# Patient Record
Sex: MALE | Race: WHITE | HISPANIC OR LATINO | Employment: FULL TIME | URBAN - METROPOLITAN AREA
[De-identification: names, ages, dates, MRNs, and addresses within clinical notes are randomized per-mention and may not be internally consistent; named-entity substitution may affect disease eponyms.]

---

## 2017-10-16 ENCOUNTER — OCCUPATIONAL MEDICINE (OUTPATIENT)
Dept: URGENT CARE | Facility: PHYSICIAN GROUP | Age: 32
End: 2017-10-16
Payer: COMMERCIAL

## 2017-10-16 VITALS
BODY MASS INDEX: 28.14 KG/M2 | HEART RATE: 82 BPM | TEMPERATURE: 98.4 F | SYSTOLIC BLOOD PRESSURE: 112 MMHG | OXYGEN SATURATION: 100 % | HEIGHT: 71 IN | DIASTOLIC BLOOD PRESSURE: 76 MMHG | RESPIRATION RATE: 15 BRPM | WEIGHT: 201 LBS

## 2017-10-16 DIAGNOSIS — S83.411A SPRAIN OF MEDIAL COLLATERAL LIGAMENT OF RIGHT KNEE, INITIAL ENCOUNTER: ICD-10-CM

## 2017-10-16 LAB
AMP AMPHETAMINE: NORMAL
BREATH ALCOHOL COMMENT: NORMAL
COC COCAINE: NORMAL
INT CON NEG: NEGATIVE
INT CON POS: POSITIVE
MET METHAMPHETAMINES: NORMAL
OPI OPIATES: NORMAL
PCP PHENCYCLIDINE: NORMAL
POC BREATHALIZER: 0 PERCENT (ref 0–0.01)
POC DRUG COMMENT 753798-OCCUPATIONAL HEALTH: NORMAL
THC: NORMAL

## 2017-10-16 PROCEDURE — 80305 DRUG TEST PRSMV DIR OPT OBS: CPT | Mod: 29 | Performed by: PHYSICIAN ASSISTANT

## 2017-10-16 PROCEDURE — 99203 OFFICE O/P NEW LOW 30 MIN: CPT | Mod: 25,29 | Performed by: PHYSICIAN ASSISTANT

## 2017-10-16 PROCEDURE — 82075 ASSAY OF BREATH ETHANOL: CPT | Mod: 29 | Performed by: PHYSICIAN ASSISTANT

## 2017-10-16 ASSESSMENT — ENCOUNTER SYMPTOMS
CONSTITUTIONAL NEGATIVE: 1
BRUISES/BLEEDS EASILY: 0
NEUROLOGICAL NEGATIVE: 1

## 2017-10-16 NOTE — PROGRESS NOTES
"Subjective:      Alfredo Damon is a 32 y.o. male who presents with Knee Injury (Right knee. Twisted when coming down a ladder three days.)      DOI: 10/13/17, 1700.  Right knee.   Patient states he was states as he was coming down ladder and missed footing and slipped, got his foot stuck and believes he twisted the knee as he fell.  Had lots of pain and swelling Saturday and yesterday but today he feels that overall the pain has improved mildly.  Still does hurt to bend fully and some pain after walking/standing.    No numbness or tingling. He states he has put topical pain ointment and iced it.  No previous injury or pain with this knee.  He does not have a second job.    used: [employer] # [id number]     Knee Injury     as above.     Review of Systems   Constitutional: Negative.    Musculoskeletal:        SEE HPI   Skin: Negative.    Neurological: Negative.    Endo/Heme/Allergies: Does not bruise/bleed easily.       PMH:  has no past medical history on file.  MEDS:   Current Outpatient Prescriptions:   •  ibuprofen (MOTRIN) 800 MG Tab, Take 1 Tab by mouth every 8 hours as needed (pain)., Disp: 30 Tab, Rfl: 0  ALLERGIES: No Known Allergies  SURGHX: No past surgical history on file.  SOCHX:  reports that he has been smoking Cigarettes.  He has been smoking about 0.50 packs per day. He has never used smokeless tobacco. He reports that he drinks alcohol. He reports that he does not use drugs.  FH: Family history was reviewed, no pertinent findings to report     Objective:     /76   Pulse 82   Temp 36.9 °C (98.4 °F)   Resp 15   Ht 1.81 m (5' 11.25\")   Wt 91.2 kg (201 lb)   SpO2 100%   BMI 27.84 kg/m²      Physical Exam   Constitutional: He is oriented to person, place, and time. He appears well-developed and well-nourished. No distress.   Cardiovascular: Normal rate and regular rhythm.    Pulmonary/Chest: Effort normal and breath sounds normal.   Neurological: He is alert and oriented to person, " place, and time.   Skin: Skin is warm and dry.   Psychiatric: He has a normal mood and affect. His behavior is normal.     Vitals reviewed  Right knee: On inspection there is minimal appreciable swelling as compared to the left knee.   He does have mild medial aspect joint tenderness.   FROM however pain with full flexion and extension.  He has negative anterior and posterior drawer.  Mild + valgus stress.  Mild + bilateral Dick at full extension.        Assessment/Plan:     1. Sprain of medial collateral ligament of right knee, initial encounter         -restrictions as above, overall improving right knee sprain  -wrap placed, wear at work.   -RAD as above, also reviewed by myself.   -RICE therapy discussed in detail.  NSAIDs encouraged prn OTC  -RTC 3 days, sooner if better and wanting release to full duty.     Loni Davis P.A.-C.

## 2017-10-16 NOTE — LETTER
[facility]  [address] [place] [address]. [address] - [place] [address]  Phone:  [phone] - Fax:  [phone]   [facility] Progress Report and Disability Certification  Date of Service: 10/16/2017   No Show:  No  Date / Time of Next Visit: 10/19/2017 @ 8:30 AM   Claim Information   Patient Name: Alfredo Damon  Claim Number:     Employer:   [employer] Date of Injury: 10/13/2017     Insurer / TPA: [employer]  ID / SSN:     Occupation: contruction  Diagnosis: The encounter diagnosis was Sprain of medial collateral ligament of right knee, initial encounter.    Medical Information   Related to Industrial Injury? Yes    Subjective Complaints:  DOI: 10/13/17, 1700.  Right knee.   Patient states he was states as he was coming down ladder and missed footing and slipped, got his foot stuck and believes he twisted the knee as he fell.  Had lots of pain and swelling Saturday and yesterday but today he feels that overall the pain has improved mildly.  Still does hurt to bend fully and some pain after walking/standing.    No numbness or tingling. He states he has put topical pain ointment and iced it.  No previous injury or pain with this knee.  He does not have a second job.    used: [employer] # [id number]   Objective Findings: Vitals reviewed  Right knee: On inspection there is minimal appreciable swelling as compared to the left knee.   He does have mild medial aspect joint tenderness.   FROM however pain with full flexion and extension.  He has negative anterior and posterior drawer.  Mild + valgus stress.  Mild + bilateral Dick at full extension.    Pre-Existing Condition(s): None   Assessment:   Initial Visit    Status: Additional Care Required  Permanent Disability:No    Plan:      Diagnostics:   Comments:n/a, currently improving soft tissue injury    Comments:       Disability Information   Status: Released to Restricted Duty    From:   10/16/2017  Through: 10/19/2017 Restrictions are: Temporary   Physical Restrictions   Sitting:    Standing:  < or = to 4 hrs/day Stooping:    Bending:      Squattin hrs/day Walking:  < or = to 4 hrs/day Climbin hrs/day Pushing:      Pulling:    Other:    Reaching Above Shoulder (L):   Reaching Above Shoulder (R):       Reaching Below Shoulder (L):    Reaching Below Shoulder (R):      Not to exceed Weight Limits   Carrying(hrs):   Weight Limit(lb):   Lifting(hrs):   Weight  Limit(lb):     Comments: -restrictions as above, overall improving right knee sprain  -wrap placed, wear at work.   -RAD as above, also reviewed by myself.   -RICE therapy discussed in detail.  NSAIDs encouraged prn OTC  -RTC 3 days, sooner if better and wanting release to full duty.       Repetitive Actions   Hands: i.e. Fine Manipulations from Grasping:     Feet: i.e. Operating Foot Controls:     Driving / Operate Machinery:     Physician Name: Dewayne Davis P.A.-C. Physician Signature: DEWAYNE Arteaga P.A.-C. e-Signature: Dr. Antony Garcia, Medical Director   Clinic Name / Location: [address]  [place] [address] Clinic Phone Number: Dept: [phone]   Appointment Time: 8:55 Am Visit Start Time: 9:04 AM   Check-In Time:  9:00 Am Visit Discharge Time: 10:07 AM   Original-Treating Physician or Chiropractor    Page 2-Insurer/TPA    Page 3-Employer    Page 4-Employee

## 2017-10-16 NOTE — LETTER
"EMPLOYEE’S CLAIM FOR COMPENSATION/ REPORT OF INITIAL TREATMENT  FORM C-4    EMPLOYEE’S CLAIM - PROVIDE ALL INFORMATION REQUESTED   First Name  Alfredo Last Name  Marisol Birthdate                    1985                Sex  male Claim Number   Home Address  [address] Age  32 y.o. 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 (if applicable)  Un piso     How did this injury or occupational disease occur? (Be specific an answer in detail. Use additional sheet if necessary)  Ignacio sweet keisha. Was climbing down a ladder when my right knee popped and   got swollen over the weekend.   If you believe that you have an occupational disease, when did you first have knowledge of the disability and it relationship to your employment?  n/a Witnesses to the Accident  n/a      Nature of Injury or Occupational Disease  Strain  Part(s) of Body Injured or Affected  Knee (R), ,     I certify that the above is true and correct to the best of my knowledge and that I have provided this " information in order to obtain the benefits of [place] or [place]  I hereby authorize any physician, chiropractor, surgeon, practitioner, or other person, any hospital, including [facility] or [facility] hospital, any medical service organization, any insurance company, or other institution or organization to release to each other, any medical or other information, including benefits paid or payable, pertinent to this injury or disease, except information relative to diagnosis, treatment and/or counseling for AIDS, psychological conditions, alcohol or controlled substances, for which I must give specific authorization.  A Photostat of this authorization shall be as valid as the original.     Date   Place   Employee’s Signature   THIS REPORT MUST BE COMPLETED AND MAILED WITHIN 3 WORKING DAYS OF TREATMENT   Place  [facility]  Name of Facility  [facility]   Date  10/16/2017 Diagnosis  (S83.411A) Sprain of medial collateral ligament of right knee, initial encounter Is there evidence the injured employee was under the influence of alcohol and/or another controlled substance at the time of accident?   Hour  9:04 AM Description of Injury or Disease  The encounter diagnosis was Sprain of medial collateral ligament of right knee, initial encounter. No   Treatment  -restrictions as above, overall improving right knee sprain  -wrap placed, wear at work.   -RAD as above, also reviewed by myself.   -RICE therapy discussed in detail.  NSAIDs encouraged prn OTC  -RTC 3 days, sooner if better and wanting release to full duty.   Have you advised the patient to remain off work five days or more? No   X-Ray Findings      If Yes   From Date  To Date      From information given by the employee, together with medical evidence, can you directly connect this injury or occupational disease as job incurred?  Yes If  "No Full Duty  No Modified Duty  Yes   Is additional medical care by a physician indicated?  Yes If Modified Duty, Specify any Limitations / Restrictions  SEE d-39   Do you know of any previous injury or disease contributing to this condition or occupational disease?                            No   Date  10/16/2017 Print Doctor’s Name Dewayne Davis P.A.-C. I certify the employer’s copy of  this form was mailed on:   Address  [address] [place] [address]. [address] Insurer’s Use Only     [facility] Zip  [address]    Provider’s Tax ID Number  [id number] Telephone  Dept: [phone]        e-DEWAYNE Hernadez P.A.-C.   e-Signature: Dr. Antony Garcia, Medical Director Degree  PAULINE        ORIGINAL-TREATING PHYSICIAN OR CHIROPRACTOR    PAGE 2-INSURER/TPA    PAGE 3-EMPLOYER    PAGE 4-EMPLOYEE             Form C-4 (rev10/07)              BRIEF DESCRIPTION OF RIGHTS AND BENEFITS  (Pursuant to [place]RS 616C.050)    Notice of Injury or Occupational Disease (Incident Report Form C-1): If an injury or occupational disease (OD) arises out of and in the  course of employment, you must provide written notice to your employer as soon as practicable, but no later than 7 days after the accident or  OD. Your employer shall maintain a sufficient supply of the required forms.    Claim for Compensation (Form C-4): If medical treatment is sought, the form C-4 is available at the place of initial treatment. A completed  \"Claim for Compensation\" (Form C-4) must be filed within 90 days after an accident or OD. The treating physician or chiropractor must,  within 3 working days after treatment, complete and mail to the employer, the employer's insurer and third-party , the Claim for  Compensation.    Medical Treatment: If you require medical treatment for your on-the-job injury or OD, you may be required to select a physician or  chiropractor from a list provided by your workers’ compensation insurer, if it has contracted " with an Organization for Managed Care (MCO) or  Preferred Provider Organization (PPO) or providers of health care. If your employer has not entered into a contract with an MCO or PPO, you  may select a physician or chiropractor from the Panel of Physicians and Chiropractors. Any medical costs related to your industrial injury or  OD will be paid by your insurer.    Temporary Total Disability (TTD): If your doctor has certified that you are unable to work for a period of at least 5 consecutive days, or 5  cumulative days in a 20-day period, or places restrictions on you that your employer does not accommodate, you may be entitled to TTD  compensation.    Temporary Partial Disability (TPD): If the wage you receive upon reemployment is less than the compensation for TTD to which you are  entitled, the insurer may be required to pay you TPD compensation to make up the difference. TPD can only be paid for a maximum of 24  months.    Permanent Partial Disability (PPD): When your medical condition is stable and there is an indication of a PPD as a result of your injury or  OD, within 30 days, your insurer must arrange for an evaluation by a rating physician or chiropractor to determine the degree of your PPD. The  amount of your PPD award depends on the date of injury, the results of the PPD evaluation and your age and wage.    Permanent Total Disability (PTD): If you are medically certified by a treating physician or chiropractor as permanently and totally disabled  and have been granted a PTD status by your insurer, you are entitled to receive monthly benefits not to exceed 66 2/3% of your average  monthly wage. The amount of your PTD payments is subject to reduction if you previously received a PPD award.    Vocational Rehabilitation Services: You may be eligible for vocational rehabilitation services if you are unable to return to the job due to a  permanent physical impairment or permanent restrictions as a result of  your injury or occupational disease.    Transportation and Per Rafa Reimbursement: You may be eligible for travel expenses and per rafa associated with medical treatment.    Reopening: You may be able to reopen your claim if your condition worsens after claim closure.    Appeal Process: If you disagree with a written determination issued by the insurer or the insurer does not respond to your request, you may  appeal to the Department of Administration, , by following the instructions contained in your determination letter. You must  appeal the determination within 70 days from the date of the determination letter at [address], [address], [place], [place]  [address], or [address], [address], [place], [place] [address]. If you disagree with the  decision, you may appeal to the  Department of Administration, . You must file your appeal within 30 days from the date of the  decision  letter at [address], [address], [place], [place] [address], or [address], [facility] [address], [place] [address]. If you  disagree with a decision of an , you may file a petition for judicial review with the District Court. You must do so within 30  days of the Appeal Officer’s decision. You may be represented by an  at your own expense or you may contact the [facility] for possible  representation.    [place]  for Injured Workers (NAIW): If you disagree with a  decision, you may request that NAIW represent you  without charge at an  Hearing. For information regarding denial of benefits, you may contact the [facility] at: [address], [address], [place] [address], [phone], or [address] [facility] [address], [place] [address], [phone]    To File a Complaint with the Division: If you wish to file a complaint with the  of the Division of Industrial  Relations (DIR),  please contact the Workers’ Compensation Section, [address], [address], [place], [place] [address], telephone [phone], or  [address], [address], [place], [place] [address], telephone [phone].    For assistance with Workers’ Compensation Issues: you may contact the Office of the [facility] Consumer Health Assistance, [address] [place] [address], [address], [place], [place] [address], Toll Free 1-285.981.8638, Web site: http://govcha.Critical access hospital.nv., E-mail  [email or web address].                                                                                                                                                                                                                                   __________________________________________________________________                                                                   _________________                Employee Name / Signature                                                                                                                                                       Date                                                                                                                                                                                                     D-2 (rev. 10/07)

## 2017-10-16 NOTE — PATIENT INSTRUCTIONS
Esguince de rodilla  (Knee Sprain)    Un esguince de rodilla es un desgarro en terri de los tejidos jordan y fibrosos (ligamentos) que conectan los huesos de la rodilla. La gravedad del esguince depende de cuánto ligamento se rompe. La ruptura puede ser parcial o completa.  CAUSAS   A menudo, los esguinces son el resultado de lina caída o lina lesión. La fuerza del impacto hace que las fibras del ligamento se estiren más de holt lars normal. Nemo exceso de tensión es la causa de que las fibras del ligamento se rompan.  SIGNOS Y SÍNTOMAS   Es posible que pierda el movimiento de la rodilla. Otros síntomas son:  · Moretones.  · Dolor en la anthony de la rodilla.  · Sensibilidad de la rodilla al tacto.  · Hinchazón.  DIAGNÓSTICO   Para diagnosticar un esguince de rodilla, holt médico le hará un examen físico de la rodilla. Además, puede indicarle que se holly lina radiografía de la rodilla para asegurarse de que no haya huesos fracturados.  TRATAMIENTO   Si el ligamento está parcialmente roto, el tratamiento, habitualmente, consiste en mantener la rodilla en lina posición fija (inmovilización) o en usar un soporte ra algunas semanas cuando realice actividades que requieran movimiento. Para ello, holt médico colocará un vendaje, un yeso o lina férula para impedir que la rodilla se mueva y para que le brinde apoyo ra los movimientos hasta que esta se cure. En el valery de un ligamento parcialmente roto, el proceso de curación generalmente demora de 4 a 6 semanas.  Si el ligamento está completamente roto, según de qué ligamento se trate, podrá necesitar lina cirugía para volver a unirlo al hueso o para reconstruirlo. Después de la cirugía, le colocarán un yeso o lina férula que no podrá quitarse ra 4 a 6 semanas mientras el ligamento se tj.  INSTRUCCIONES PARA EL CUIDADO EN EL HOGAR  · Mantenga elevada la rodilla lesionada para disminuir la hinchazón.  · Para aliviar el dolor y la hinchazón, aplique hielo en la anthony de la  lesión:  ¨ Ponga el hielo en lina bolsa plástica.  ¨ Colóquese lina toalla entre la piel y la bolsa de hielo.  ¨ Deje el hielo ra 20 minutos, 2 a 3 veces por día.  · Depew los analgésicos únicamente erick le indicó holt médico.  · No deje la rodilla sin protección hasta que el dolor y la rigidez desaparezcan (generalmente en el término de 4 a 6 semanas).  · Si tiene puesto un yeso o lina férula, no deje que se moje. Si le abbasi indicado que no puede quitárselo, cúbralo con lina bolsa plástica para darse lina ducha o un baño. No practique natación.  · Holt médico puede indicarle ejercicios para que holly ra la recuperación, a fin de evitar o limitar la debilidad y la rigidez permanentes.  SOLICITE ATENCIÓN MÉDICA DE INMEDIATO SI:  · El yeso o la férula se dañan.  · El dolor empeora.  · Tiene dolor intenso, hinchazón o adormecimiento debajo del yeso o la férula.  ASEGÚRESE DE QUE:  · Comprende estas instrucciones.  · Controlará holt afección.  · Recibirá ayuda de inmediato si no mejora o si empeora.     Esta información no tiene erick fin reemplazar el consejo del médico. Asegúrese de hacerle al médico cualquier pregunta que tenga.     Document Released: 12/18/2006 Document Revised: 01/08/2016  [employer] Interactive Patient Education ©2016 [employer] Inc.

## 2017-10-19 ENCOUNTER — OCCUPATIONAL MEDICINE (OUTPATIENT)
Dept: URGENT CARE | Facility: PHYSICIAN GROUP | Age: 32
End: 2017-10-19
Payer: COMMERCIAL

## 2017-10-19 VITALS
WEIGHT: 200 LBS | HEART RATE: 70 BPM | HEIGHT: 71 IN | DIASTOLIC BLOOD PRESSURE: 68 MMHG | SYSTOLIC BLOOD PRESSURE: 112 MMHG | BODY MASS INDEX: 28 KG/M2 | TEMPERATURE: 98.3 F | OXYGEN SATURATION: 96 %

## 2017-10-19 DIAGNOSIS — S83.411D SPRAIN OF MEDIAL COLLATERAL LIGAMENT OF RIGHT KNEE, SUBSEQUENT ENCOUNTER: ICD-10-CM

## 2017-10-19 PROCEDURE — 99213 OFFICE O/P EST LOW 20 MIN: CPT | Mod: 29 | Performed by: PHYSICIAN ASSISTANT

## 2017-10-19 ASSESSMENT — ENCOUNTER SYMPTOMS
FOCAL WEAKNESS: 0
TINGLING: 0
SENSORY CHANGE: 0

## 2017-10-19 NOTE — LETTER
[facility]  [address] [place] [address]. [address] - [place] [address]  Phone:  [phone] - Fax:  [phone]   [facility] Progress Report and Disability Certification  Date of Service: 10/19/2017   No Show:  No  Date / Time of Next Visit:     Claim Information   Patient Name: Alfredo Damon  Claim Number:     Employer:  [employer] Date of Injury: 10/13/2017     Insurer / TPA: [employer]  ID / SSN: [id number]    Occupation: contruction  Diagnosis: The encounter diagnosis was Sprain of medial collateral ligament of right knee, subsequent encounter.    Medical Information   Related to Industrial Injury? Yes    Subjective Complaints:  10/16/2017: PT presents today for follow up exam of knee injury that occurred while at work. PT states his knee has improved significantly while using the Ace wrap and OTC medications.  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  PT states he has been able to bend, squat and go up and down steps without pain so he is ready to return to work.  0/10 pain at rest, 1/10 pain while working.     Objective Findings: Right knee is not swollen, non tender on exam.  PT has FROM in all planes, squats without pain or grimace.  No pain with ant/post drawer or valgus/varus stress.  Neg Lachman.  PT ambulates with normal gait, good balance.     Pre-Existing Condition(s):     Assessment:   Condition Improved    Status: Discharged /  MMI  Permanent Disability:No    Plan:      Diagnostics:      Comments:       Disability Information   Status:      From:     Through:   Restrictions are:     Physical Restrictions   Sitting:    Standing:    Stooping:    Bending:      Squatting:    Walking:    Climbing:    Pushing:      Pulling:    Other:    Reaching Above Shoulder (L):   Reaching Above Shoulder (R):       Reaching Below Shoulder (L):    Reaching Below Shoulder (R):      Not to exceed Weight Limits      Carrying(hrs):   Weight Limit(lb):   Lifting(hrs):   Weight  Limit(lb):     Comments:      Repetitive Actions   Hands: i.e. Fine Manipulations from Grasping:     Feet: i.e. Operating Foot Controls:     Driving / Operate Machinery:     Physician Name: Valentina Chan P.A.-C. Physician Signature: VALENTINA Nguyen P.A.-C. e-Signature:  , Medical Director   Clinic Name / Location: [address]  [place] [address] Clinic Phone Number: Dept: [phone]   Appointment Time: 8:30 Am Visit Start Time: 8:16 AM   Check-In Time:  8:04 Am Visit Discharge Time: 8:43 AM   Original-Treating Physician or Chiropractor    Page 2-Insurer/TPA    Page 3-Employer    Page 4-Employee

## 2017-10-19 NOTE — PROGRESS NOTES
"Subjective:      Alfredo Damon is a 32 y.o. male who presents with Knee Injury (Follow up knee injury. )    Pt PMH, SocHx, SurgHx, FamHx, Drug allergies and medications reviewed with pt/EPIC.      Family history reviewed, it is not pertinent to this complaint.     10/16/2017: PT presents today for follow up exam of knee injury that occurred while at work. PT states his knee has improved significantly while using the Ace wrap and OTC medications.  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  PT states he has been able to bend, squat and go up and down steps without pain so he is ready to return to work.  0/10 pain at rest, 1/10 pain while working.       Work comp follow up.     [employer]  was utilized for this entire visit.         Knee Injury         Review of Systems   Musculoskeletal: Positive for joint pain.   Neurological: Negative for tingling, sensory change and focal weakness.   All other systems reviewed and are negative.         Objective:     /68   Pulse 70   Temp 36.8 °C (98.3 °F)   Ht 1.803 m (5' 11\")   Wt 90.7 kg (200 lb)   SpO2 96%   BMI 27.89 kg/m²      Physical Exam   Constitutional: He is oriented to person, place, and time. He appears well-developed and well-nourished. No distress.   HENT:   Head: Normocephalic and atraumatic.   Eyes: EOM are normal. Pupils are equal, round, and reactive to light.   Neck: Normal range of motion. Neck supple. No JVD present.   Cardiovascular: Normal rate.    Pulmonary/Chest: Effort normal.   Abdominal: Soft.   Lymphadenopathy:     He has no cervical adenopathy.   Neurological: He is alert and oriented to person, place, and time.   Skin: Skin is warm and dry.       Right knee is not swollen, non tender on exam.  PT has FROM in all planes, squats without pain or grimace.  No pain with ant/post drawer or valgus/varus stress.  Neg Lachman.  PT ambulates with normal gait, good " balance.         Assessment/Plan:     1. Sprain of medial collateral ligament of right knee, subsequent encounter       PT discharged [facility]MI.    Pt can continue RICE treatment if needed.      PT should follow up with PCP in 1-2 days for re-evaluation if symptoms have not improved.  Discussed red flags and reasons to return to UC or ED.  Pt and/or family verbalized understanding of diagnosis and follow up instructions and was given informational handout on diagnosis.  PT discharged.